# Patient Record
Sex: MALE | Race: ASIAN | NOT HISPANIC OR LATINO
[De-identification: names, ages, dates, MRNs, and addresses within clinical notes are randomized per-mention and may not be internally consistent; named-entity substitution may affect disease eponyms.]

---

## 2022-05-13 PROBLEM — Z00.00 ENCOUNTER FOR PREVENTIVE HEALTH EXAMINATION: Status: ACTIVE | Noted: 2022-05-13

## 2022-06-03 ENCOUNTER — APPOINTMENT (OUTPATIENT)
Dept: UROLOGY | Facility: CLINIC | Age: 52
End: 2022-06-03

## 2022-06-17 ENCOUNTER — APPOINTMENT (OUTPATIENT)
Dept: UROLOGY | Facility: CLINIC | Age: 52
End: 2022-06-17
Payer: MEDICAID

## 2022-06-17 VITALS
OXYGEN SATURATION: 97 % | DIASTOLIC BLOOD PRESSURE: 99 MMHG | SYSTOLIC BLOOD PRESSURE: 157 MMHG | TEMPERATURE: 98 F | HEIGHT: 61.02 IN | RESPIRATION RATE: 18 BRPM | BODY MASS INDEX: 30.78 KG/M2 | HEART RATE: 68 BPM | WEIGHT: 163 LBS

## 2022-06-17 DIAGNOSIS — Z78.9 OTHER SPECIFIED HEALTH STATUS: ICD-10-CM

## 2022-06-17 DIAGNOSIS — F17.210 NICOTINE DEPENDENCE, CIGARETTES, UNCOMPLICATED: ICD-10-CM

## 2022-06-17 PROCEDURE — 99204 OFFICE O/P NEW MOD 45 MIN: CPT

## 2022-06-17 RX ORDER — METFORMIN HYDROCHLORIDE 1000 MG/1
1000 TABLET, COATED ORAL
Qty: 60 | Refills: 0 | Status: ACTIVE | COMMUNITY
Start: 2022-06-01

## 2022-06-17 RX ORDER — ASPIRIN 81 MG/1
81 TABLET, COATED ORAL
Qty: 30 | Refills: 0 | Status: ACTIVE | COMMUNITY
Start: 2022-05-25

## 2022-06-17 RX ORDER — CHLORHEXIDINE GLUCONATE, 0.12% ORAL RINSE 1.2 MG/ML
0.12 SOLUTION DENTAL
Qty: 473 | Refills: 0 | Status: ACTIVE | COMMUNITY
Start: 2022-05-04

## 2022-06-17 RX ORDER — ATORVASTATIN CALCIUM 40 MG/1
40 TABLET, FILM COATED ORAL
Qty: 30 | Refills: 0 | Status: ACTIVE | COMMUNITY
Start: 2022-05-25

## 2022-06-17 RX ORDER — METOPROLOL SUCCINATE 50 MG/1
50 TABLET, EXTENDED RELEASE ORAL
Qty: 30 | Refills: 0 | Status: ACTIVE | COMMUNITY
Start: 2022-05-02

## 2022-06-17 RX ORDER — ERGOCALCIFEROL 1.25 MG/1
1.25 MG CAPSULE, LIQUID FILLED ORAL
Qty: 4 | Refills: 0 | Status: ACTIVE | COMMUNITY
Start: 2022-05-25

## 2022-06-17 RX ORDER — LOSARTAN POTASSIUM 25 MG/1
25 TABLET, FILM COATED ORAL
Qty: 30 | Refills: 0 | Status: ACTIVE | COMMUNITY
Start: 2022-06-01

## 2022-06-17 RX ORDER — ATORVASTATIN CALCIUM 80 MG/1
80 TABLET, FILM COATED ORAL
Qty: 30 | Refills: 0 | Status: ACTIVE | COMMUNITY
Start: 2022-02-13

## 2022-06-17 RX ORDER — AMLODIPINE BESYLATE 5 MG/1
5 TABLET ORAL
Qty: 30 | Refills: 0 | Status: ACTIVE | COMMUNITY
Start: 2022-06-01

## 2022-06-17 RX ORDER — LATANOPROST/PF 0.005 %
0.01 DROPS OPHTHALMIC (EYE)
Qty: 2 | Refills: 0 | Status: ACTIVE | COMMUNITY
Start: 2022-03-11

## 2022-06-17 RX ORDER — CLOPIDOGREL BISULFATE 75 MG/1
75 TABLET, FILM COATED ORAL
Qty: 30 | Refills: 0 | Status: ACTIVE | COMMUNITY
Start: 2022-05-02

## 2022-06-17 RX ORDER — IBUPROFEN 400 MG/1
400 TABLET, FILM COATED ORAL
Qty: 30 | Refills: 0 | Status: ACTIVE | COMMUNITY
Start: 2022-05-04

## 2022-06-19 LAB
ANION GAP SERPL CALC-SCNC: 12 MMOL/L
APPEARANCE: CLEAR
BACTERIA: NEGATIVE
BILIRUBIN URINE: NEGATIVE
BLOOD URINE: NEGATIVE
BUN SERPL-MCNC: 23 MG/DL
CALCIUM SERPL-MCNC: 9.6 MG/DL
CHLORIDE SERPL-SCNC: 103 MMOL/L
CO2 SERPL-SCNC: 25 MMOL/L
COLOR: YELLOW
CREAT SERPL-MCNC: 1.14 MG/DL
EGFR: 78 ML/MIN/1.73M2
GLUCOSE QUALITATIVE U: ABNORMAL
GLUCOSE SERPL-MCNC: 214 MG/DL
HYALINE CASTS: 0 /LPF
KETONES URINE: NEGATIVE
LEUKOCYTE ESTERASE URINE: NEGATIVE
MICROSCOPIC-UA: NORMAL
NITRITE URINE: NEGATIVE
PH URINE: 6.5
POTASSIUM SERPL-SCNC: 4.6 MMOL/L
PROTEIN URINE: NORMAL
PSA FREE FLD-MCNC: 50 %
PSA FREE SERPL-MCNC: 0.11 NG/ML
PSA SERPL-MCNC: 0.21 NG/ML
RED BLOOD CELLS URINE: 1 /HPF
SODIUM SERPL-SCNC: 140 MMOL/L
SPECIFIC GRAVITY URINE: 1.03
SQUAMOUS EPITHELIAL CELLS: 0 /HPF
UROBILINOGEN URINE: NORMAL
WHITE BLOOD CELLS URINE: 0 /HPF

## 2022-06-26 NOTE — LETTER BODY
[FreeTextEntry1] : Sayda Romo MD\par [address], [address], \[place] [address]\par [phone]\par \par Dear Dr. Romo, \par \par Reason for Visit: BPH.\par \par This is a 52 year-old Cantonese-speaking gentleman with diabetes presenting with symptoms of BPH. Patient is here today for evaluation. His recent pelvic ultrasound demonstrated an enlarged prostate gland measuring 61 cc. The patient reports taking Flomax and Proscar regularly for his BPH symptoms. He denies any side effects or difficulties with the medications. The patient notes good urine flow and stable urinary symptoms on medical therapy.  He denies any hematuria or urinary incontinence. He denies any aggravating or alleviating factors. He reports no pain. All other review of systems are negative.  Past medical history, family history and social history were inquired and were noncontributory to current condition. The patient drinks alcohol. Patient currently smokes. I encourage the patient to stop smoking and seek smoking cessation programs. I discuss with him to potential long term complications and health effects from smoking. I gave the patient additional information including the [facility] Refer-to-Quit program. I spent over 3 minutes counseling the patient regarding smoking cessation. Medications and allergies were reviewed. He has no known allergies to medication. \par \par On examination, the patient is a healthy-appearing gentleman in no acute distress. He is alert and oriented follows commands. He has normal mood and affect. He is normocephalic. Neck is supple. Oral no thrush Respirations are unlabored. His abdomen is soft and nontender. Bladder is nonpalpable. No CVA tenderness. Neurologically he is grossly intact. No peripheral edema. Skin without gross abnormality. He has normal male external genitalia. Normal meatus. Bilateral testes are descended intrascrotally and normal to palpation. On rectal examination, there is normal sphincter tone. The prostate is clinically benign without focal induration or nodularity.\par \par I personally reviewed ultrasound images with the patient today and images demonstrated enlarged prostate gland measuring 61 cc. \par \par ASSESSMENT: BPH.\par \par I counseled the patient on the various etiology of his symptoms. I discussed the natural history of BPH and the treatment options available. Given his stable urinary symptoms, I recommended the patient continue taking Flomax and Proscar. I renewed the patient's prescription for Flomax and Proscar today. I encouraged the patient to continue medications regularly as directed. He will obtain PSA and BMP to establish baselines. He will follow up in 6 months. Risks and alternatives were discussed. I answered the patient questions. The patient will follow-up as directed and will contact me with any questions or concerns. Thank you for the opportunity to participate in the care of this patient, I will keep you updated on his progress. \par \par Plan: Continue Flomax and Proscar. PSA. BMP. Follow up in 6 months.

## 2022-06-26 NOTE — ADDENDUM
[FreeTextEntry1] : Entered by Geovany Reddy, acting as scribe for Dr. Alejandro Sethi.\par \par The documentation recorded by the scribe accurately reflects the service I personally performed and the decisions made by me.

## 2022-07-20 ENCOUNTER — NON-APPOINTMENT (OUTPATIENT)
Age: 52
End: 2022-07-20

## 2022-12-16 ENCOUNTER — APPOINTMENT (OUTPATIENT)
Dept: UROLOGY | Facility: CLINIC | Age: 52
End: 2022-12-16

## 2022-12-16 VITALS
DIASTOLIC BLOOD PRESSURE: 81 MMHG | SYSTOLIC BLOOD PRESSURE: 153 MMHG | HEART RATE: 85 BPM | WEIGHT: 162 LBS | BODY MASS INDEX: 30.59 KG/M2 | RESPIRATION RATE: 18 BRPM | TEMPERATURE: 97.1 F | OXYGEN SATURATION: 98 %

## 2022-12-16 DIAGNOSIS — N40.1 BENIGN PROSTATIC HYPERPLASIA WITH LOWER URINARY TRACT SYMPMS: ICD-10-CM

## 2022-12-16 PROCEDURE — 99214 OFFICE O/P EST MOD 30 MIN: CPT

## 2022-12-16 NOTE — ADDENDUM
[FreeTextEntry1] : Entered by VALERIE CHOUDHARY, acting as scribe for Dr. Alejandro Sethi.\par The documentation recorded by the scribe accurately reflects the service I personally performed and the decisions made by me.

## 2022-12-16 NOTE — LETTER BODY
[FreeTextEntry1] : Sayda Romo MD\par [address], [address], \[place] [address]\par [phone]\par \par Dear Dr. Romo, \par \par Reason for Visit: BPH. Urinary urgency. Urinary frequency.\par \par This is a 52 year-old Cantonese-speaking gentleman with diabetes presenting with symptoms of BPH. His recent pelvic ultrasound demonstrated an enlarged prostate gland measuring 61 cc. Patient is here today for follow up. Since he was last seen, the patient reports taking Flomax and Proscar regularly for his BPH symptoms. He denies any side effects or difficulties with the medications. The patient notes good urine flow and stable urinary symptoms on medical therapy. However, he reports urinary urgency and frequency. The patient denies any history of glaucoma. He denies any hematuria or urinary incontinence. He reports no pain. All other review of systems are negative. Past medical history, family history and social history were inquired and were noncontributory to current condition. The patient drinks alcohol. Patient currently smokes. I encourage the patient to stop smoking and seek smoking cessation programs. I discuss with him to potential long term complications and health effects from smoking. I gave the patient additional information including the [facility] Refer-to-Quit program. I spent over 3 minutes counseling the patient regarding smoking cessation. Medications and allergies were reviewed. He has no known allergies to medication. \par \par On examination, the patient is a healthy-appearing gentleman in no acute distress. He is alert and oriented follows commands. He has normal mood and affect. He is normocephalic. Neck is supple. Oral no thrush Respirations are unlabored. His abdomen is soft and nontender. Bladder is nonpalpable. No CVA tenderness. Neurologically he is grossly intact. No peripheral edema. Skin without gross abnormality. He has normal male external genitalia. Normal meatus. Bilateral testes are descended intrascrotally and normal to palpation. On rectal examination, there is normal sphincter tone. The prostate is clinically benign without focal induration or nodularity.\par \par His BMP demonstrated normal renal functions, creatinine 1.14. His PSA was 0.21, which is within normal limits. His urinalysis was unremarkable.\par \par ASSESSMENT: BPH. Urinary urgency. Urinary frequency.\par \par I counseled the patient on the various etiology of his symptoms. I discussed the natural history of BPH and the treatment options available. Given his stable urinary symptoms, I recommended the patient continue taking Flomax and Proscar. I renewed the patient's prescription for Flomax and Proscar today. I encouraged the patient to continue medications regularly as directed. In terms of his urinary urgency and frequency, I recommended the patient start a trial of Oxybutynin. The patient denies any history of glaucoma. I discussed the potential side effects of the medication. I counseled the patient on its use and side effects. If the patient develops any side effects, the patient will discontinue the medication and contact me. I discussed that his poorly managed diabetes is his most concerning issue. I discussed his uncontrolled glycosuria can lead to osmotic diuresis causing polyuria. I encouraged proper glycemic blood management and dietary restriction of carbohydrates. I recommended he obtain A1C. I also recommended he see Endocrinology. Risks and alternatives were discussed. I answered the patient questions. The patient will follow-up as directed and will contact me with any questions or concerns. Thank you for the opportunity to participate in the care of this patient, I will keep you updated on his progress. \par \par Plan: Trial of Oxybutynin. Continue Flomax and Proscar. A1C. See Endocrinology. Follow up in 1 month.

## 2022-12-17 LAB
ANION GAP SERPL CALC-SCNC: 11 MMOL/L
BUN SERPL-MCNC: 16 MG/DL
CALCIUM SERPL-MCNC: 9.3 MG/DL
CHLORIDE SERPL-SCNC: 102 MMOL/L
CO2 SERPL-SCNC: 28 MMOL/L
CREAT SERPL-MCNC: 1.2 MG/DL
EGFR: 74 ML/MIN/1.73M2
ESTIMATED AVERAGE GLUCOSE: 189 MG/DL
GLUCOSE SERPL-MCNC: 160 MG/DL
HBA1C MFR BLD HPLC: 8.2 %
POTASSIUM SERPL-SCNC: 4.1 MMOL/L
PSA FREE FLD-MCNC: 49 %
PSA FREE SERPL-MCNC: 0.21 NG/ML
PSA SERPL-MCNC: 0.43 NG/ML
SODIUM SERPL-SCNC: 141 MMOL/L

## 2022-12-23 ENCOUNTER — NON-APPOINTMENT (OUTPATIENT)
Age: 52
End: 2022-12-23

## 2023-01-24 ENCOUNTER — APPOINTMENT (OUTPATIENT)
Dept: UROLOGY | Facility: CLINIC | Age: 53
End: 2023-01-24
Payer: MEDICAID

## 2023-01-24 VITALS
HEART RATE: 70 BPM | OXYGEN SATURATION: 97 % | RESPIRATION RATE: 18 BRPM | WEIGHT: 162 LBS | TEMPERATURE: 97.3 F | DIASTOLIC BLOOD PRESSURE: 83 MMHG | SYSTOLIC BLOOD PRESSURE: 137 MMHG | BODY MASS INDEX: 30.59 KG/M2

## 2023-01-24 PROCEDURE — 99214 OFFICE O/P EST MOD 30 MIN: CPT

## 2023-01-24 RX ORDER — OXYBUTYNIN CHLORIDE 10 MG/1
10 TABLET, EXTENDED RELEASE ORAL
Qty: 90 | Refills: 3 | Status: ACTIVE | COMMUNITY
Start: 2022-12-16 | End: 1900-01-01

## 2023-01-24 RX ORDER — FINASTERIDE 5 MG/1
5 TABLET, FILM COATED ORAL DAILY
Qty: 90 | Refills: 3 | Status: ACTIVE | COMMUNITY
Start: 2022-05-25 | End: 1900-01-01

## 2023-01-24 RX ORDER — TAMSULOSIN HYDROCHLORIDE 0.4 MG/1
0.4 CAPSULE ORAL
Qty: 90 | Refills: 3 | Status: ACTIVE | COMMUNITY
Start: 2022-05-25 | End: 1900-01-01

## 2023-01-24 NOTE — LETTER BODY
[FreeTextEntry1] : Sayda Romo MD\par [address], [address], \[place] [address]\par [phone]\par \par Dear Dr. Romo, \par \par Reason for Visit: BPH. Urinary urgency. Urinary frequency. Diabetes.\par \par This is a 52 year-old Cantonese-speaking gentleman with diabetes with symptoms of BPH and urinary frequency. His recent pelvic ultrasound demonstrated an enlarged prostate gland measuring 61 cc. Patient is here today for follow up. Since he was last seen, the patient did not see Endocrinology as recommended. He reports taking Flomax and Proscar regularly for his BPH symptoms. He denies any side effects or difficulties with the medications. The patient notes good urine flow and stable urinary symptoms on medical therapy. He reports taking Oxybutynin 5 mg for urinary urgency and frequency regularly without any difficulties or side effects. Patient reports mild improvement of symptoms on medical therapy. However, he has not met treatment goals. The patient denies any history of glaucoma. He denies any hematuria or urinary incontinence. He reports no pain. All other review of systems are negative. Past medical history, family history and social history were inquired and were noncontributory to current condition. The patient drinks alcohol. Patient currently smokes. I encourage the patient to stop smoking and seek smoking cessation programs. I discuss with him to potential long term complications and health effects from smoking. I gave the patient additional information including the [facility] Refer-to-Quit program. I spent over 3 minutes counseling the patient regarding smoking cessation. Medications and allergies were reviewed. He has no known allergies to medication. \par \par On examination, the patient is a healthy-appearing gentleman in no acute distress. He is alert and oriented follows commands. He has normal mood and affect. He is normocephalic. Neck is supple. Oral no thrush Respirations are unlabored. His abdomen is soft and nontender. Bladder is nonpalpable. No CVA tenderness. Neurologically he is grossly intact. No peripheral edema. Skin without gross abnormality. He has normal male external genitalia. Normal meatus. Bilateral testes are descended intrascrotally and normal to palpation. On rectal examination, there is normal sphincter tone. The prostate is clinically benign without focal induration or nodularity.\par \par His BMP from December 2022 demonstrated normal renal functions, creatinine 1.20. His PSA was 0.43, which is within normal limits. His A1c was 8.2, which is elevated.\par \par ASSESSMENT: BPH. Urinary urgency. Urinary frequency. Diabetes.\par \par I counseled the patient. In terms of his BPH, the patient reports stable symptoms on medical therapy. I recommended the patient continue taking Flomax and Proscar. I renewed the patient's prescription for Flomax and Proscar today. I encouraged the patient to continue medications regularly as directed. In terms of his urinary urgency and frequency, the patient reports mild improvement with Oxybutynin 5 mg but not meeting therapy goals. I recommended the patient increase Oxybutynin to 10 mg. The patient denies any history of glaucoma. I renewed the patient's prescription for Oxybutynin today. I encouraged the patient to continue medications regularly as directed. I discussed that his poorly managed diabetes is his most concerning issue. I discussed his uncontrolled glycosuria can lead to osmotic diuresis causing polyuria. I encouraged proper glycemic blood management and dietary restriction of carbohydrates. His recent A1c was elevated, 8.2%. I recommended he see Endocrinology. Risks and alternatives were discussed. I answered the patient questions. The patient will follow-up as directed and will contact me with any questions or concerns. Thank you for the opportunity to participate in the care of this patient, I will keep you updated on his progress. \par \par Plan: Increase Oxybutynin to 10 mg. Continue Flomax and Proscar. See Endocrinology. Follow up in 3 months.

## 2023-04-25 ENCOUNTER — APPOINTMENT (OUTPATIENT)
Dept: UROLOGY | Facility: CLINIC | Age: 53
End: 2023-04-25